# Patient Record
Sex: FEMALE | URBAN - METROPOLITAN AREA
[De-identification: names, ages, dates, MRNs, and addresses within clinical notes are randomized per-mention and may not be internally consistent; named-entity substitution may affect disease eponyms.]

---

## 2024-02-02 ENCOUNTER — LAB REQUISITION (OUTPATIENT)
Dept: LAB | Facility: HOSPITAL | Age: 24
End: 2024-02-02
Payer: COMMERCIAL

## 2024-02-02 DIAGNOSIS — Z11.3 ENCOUNTER FOR SCREENING FOR INFECTIONS WITH A PREDOMINANTLY SEXUAL MODE OF TRANSMISSION: ICD-10-CM

## 2024-02-02 PROCEDURE — 87624 HPV HI-RISK TYP POOLED RSLT: CPT

## 2024-02-02 PROCEDURE — 88141 CYTOPATH C/V INTERPRET: CPT | Performed by: PATHOLOGY

## 2024-02-02 PROCEDURE — 87389 HIV-1 AG W/HIV-1&-2 AB AG IA: CPT

## 2024-02-02 PROCEDURE — 88175 CYTOPATH C/V AUTO FLUID REDO: CPT

## 2024-02-02 PROCEDURE — 87591 N.GONORRHOEAE DNA AMP PROB: CPT

## 2024-02-02 PROCEDURE — 86780 TREPONEMA PALLIDUM: CPT

## 2024-02-02 PROCEDURE — 87800 DETECT AGNT MULT DNA DIREC: CPT

## 2024-02-02 PROCEDURE — 87491 CHLMYD TRACH DNA AMP PROBE: CPT

## 2024-02-03 LAB
C TRACH RRNA SPEC QL NAA+PROBE: NEGATIVE
HIV 1+2 AB+HIV1 P24 AG SERPL QL IA: NONREACTIVE
N GONORRHOEA DNA SPEC QL PROBE+SIG AMP: NEGATIVE
TREPONEMA PALLIDUM IGG+IGM AB [PRESENCE] IN SERUM OR PLASMA BY IMMUNOASSAY: NONREACTIVE

## 2024-02-05 ENCOUNTER — LAB REQUISITION (OUTPATIENT)
Dept: LAB | Facility: HOSPITAL | Age: 24
End: 2024-02-05
Payer: COMMERCIAL

## 2024-02-05 DIAGNOSIS — Z12.4 ENCOUNTER FOR SCREENING FOR MALIGNANT NEOPLASM OF CERVIX: ICD-10-CM

## 2024-02-05 DIAGNOSIS — Z11.51 ENCOUNTER FOR SCREENING FOR HUMAN PAPILLOMAVIRUS (HPV): ICD-10-CM

## 2024-02-19 LAB
CYTOLOGY CMNT CVX/VAG CYTO-IMP: NORMAL
HPV HR GENOTYPES PNL CVX NAA+PROBE: POSITIVE
LAB AP CONTRACEPTIVE HISTORY: NORMAL
LAB AP HPV GENOTYPE QUESTION: NO
LAB AP HPV HR: NORMAL
LABORATORY COMMENT REPORT: NORMAL
LMP START DATE: NORMAL
PATH REPORT.TOTAL CANCER: NORMAL

## 2024-02-28 ENCOUNTER — PROCEDURE VISIT (OUTPATIENT)
Dept: OBSTETRICS AND GYNECOLOGY | Facility: HOSPITAL | Age: 24
End: 2024-02-28
Payer: COMMERCIAL

## 2024-02-28 VITALS — SYSTOLIC BLOOD PRESSURE: 113 MMHG | DIASTOLIC BLOOD PRESSURE: 77 MMHG | WEIGHT: 146 LBS

## 2024-02-28 DIAGNOSIS — Z98.890 HISTORY OF COLPOSCOPY: ICD-10-CM

## 2024-02-28 DIAGNOSIS — R87.611 PAP SMEAR OF CERVIX WITH ASCUS, CANNOT EXCLUDE HGSIL: Primary | ICD-10-CM

## 2024-02-28 PROCEDURE — 81025 URINE PREGNANCY TEST: CPT | Performed by: STUDENT IN AN ORGANIZED HEALTH CARE EDUCATION/TRAINING PROGRAM

## 2024-02-28 PROCEDURE — 57455 BIOPSY OF CERVIX W/SCOPE: CPT | Performed by: STUDENT IN AN ORGANIZED HEALTH CARE EDUCATION/TRAINING PROGRAM

## 2024-02-28 PROCEDURE — 57455 BIOPSY OF CERVIX W/SCOPE: CPT | Mod: GC | Performed by: STUDENT IN AN ORGANIZED HEALTH CARE EDUCATION/TRAINING PROGRAM

## 2024-02-28 PROCEDURE — 88305 TISSUE EXAM BY PATHOLOGIST: CPT | Performed by: STUDENT IN AN ORGANIZED HEALTH CARE EDUCATION/TRAINING PROGRAM

## 2024-02-28 PROCEDURE — 88305 TISSUE EXAM BY PATHOLOGIST: CPT | Mod: TC,SUR | Performed by: STUDENT IN AN ORGANIZED HEALTH CARE EDUCATION/TRAINING PROGRAM

## 2024-02-28 ASSESSMENT — PAIN SCALES - GENERAL: PAINLEVEL: 0-NO PAIN

## 2024-02-28 NOTE — PROGRESS NOTES
Subjective   Patient ID: Stormy Delatorre is a 23 y.o. female who presents for Colposcopy (Patient here for colpo/Last PAP 2-2-24 Lsil/Patient denies falls/Patient denies pain)    Prior history as follows:  2024 LSIL/hrHPV(+), untyped  2022 LSIL/ASC-H    Smoking status: never a smoker  Contraception: ring  HPV vaccination: yes  HIV status: is HIV negative    Visit Vitals  /77     Physical Exam   General: Examination reveals a well developed, well nourished, female, in no acute distress. She is alert and cooperative.  HEENT: External ears normal. Nose normal, no erythema or discharge. Mouth and throat clear.  Neck: supple, no significant adenopathy.  Lungs: normal effort.  Cardiac: regular rate.  Abdomen: soft, nontender, nondistended  Extremities: no limitation in range of motion.  Neurological: alert, oriented, normal speech, no focal findings or movement disorder noted.  Psychological: awake and alert; oriented to person, place, and time.    Colposcopy     Date/Time: 2/28/2024 3:26 PM     Performed by: Richie Berman MD  Authorized by: Jennifer Boston MD    Procedure location: cervix    Consent:     Patient questions answered: yes      Consent obtained:  Written    Consent given by:  Patient  Universal Protocol:     A time out verifies correct patient, procedure, equipment, support staff, and site/side marked as required: yes      Patient states understanding of procedure being performed: yes      Relevant documents present and verified: yes    Indication:     Cervical indication(s): ASC-H    Pre-procedure:     Speculum was placed in the vagina: yes      Prep solution(s): acetic acid and Lugol's iodine    Procedure:     Colposcopy with: cervical biopsy and endocervical curettage      Biopsy taken: yes      Biopsy location(s): 5 o'clock, 7 o'clock    Cervix visibility: fully visualized      SCJ visibility: fully visualized      Lesion visualized: fully visualized      Acetowhite lesion(s): cervix      Acetowhite  lesion(s) description:  5 o'clock and 7 o'clock    Vascular changes: cervix      Vascular lesion description:  5 o'clock    Cervical impression: low grade      Ferric subsulfate solution applied: yes    Post-procedure:     Patient tolerance of procedure:  Patient tolerated the procedure well with no immediate complications    Assessment/Plan   5 o'clock  Colposcopic impression:  [place] index  Color 1  Margins 0  Vessels 0  Iodine staining 1  Total 2, consistent with SUNNY I    7 o'clock  Colposcopic impression:  [place] index  Color 1  Margins 0  Vessels 1  Iodine staining 0  Total 2, consistent with SUNNY I    Seen and discussed with Dr. Darvin Berman MD  Gynecology

## 2024-02-28 NOTE — PROGRESS NOTES
27 yo F presents for colposcopy for hx of LSIL/ASC-H pap. Denies smoking hx. Up to date on Gardasil vaccine. Nuvaring contraception.    Patient ID: Stormy Delatorre is a 23 y.o. female.    Colposcopy    Date/Time: 2/28/2024 3:26 PM    Performed by: Richie Berman MD  Authorized by: Jennifer Boston MD    Procedure location: cervix    Consent:     Patient questions answered: yes      Consent obtained:  Written    Consent given by:  Patient  Universal Protocol:     A time out verifies correct patient, procedure, equipment, support staff, and site/side marked as required: yes      Patient states understanding of procedure being performed: yes      Relevant documents present and verified: yes    Indication:     Cervical indication(s): ASC-H    Pre-procedure:     Speculum was placed in the vagina: yes      Prep solution(s): acetic acid and Lugol's iodine    Procedure:     Colposcopy with: cervical biopsy and endocervical curettage      Biopsy taken: yes      Biopsy location(s): 5 o'clock, 7 o'clock    Cervix visibility: fully visualized      SCJ visibility: fully visualized      Lesion visualized: fully visualized      Acetowhite lesion(s): cervix      Acetowhite lesion(s) description:  5 o'clock and 7 o'clock    Vascular changes: cervix      Vascular lesion description:  5 o'clock    Cervical impression: low grade      Ferric subsulfate solution applied: yes    Post-procedure:     Patient tolerance of procedure:  Patient tolerated the procedure well with no immediate complications    Assessment  27 yo F presents for colposcopy for hx of LSIL/ASC-H pap.    Colposcopy performed without complications. Biopsies at 5 o'clock ([place]: 2 acetowhite, iodine staining) and 7 o'clock ([place]: 2 acetowhite, vessels) consistent with SUNNY I.    Seen and discussed with Dr. Darvin Berman MD  Gynecology

## 2024-03-01 LAB — PREGNANCY TEST URINE, POC: NEGATIVE

## 2024-03-05 LAB
LABORATORY COMMENT REPORT: NORMAL
PATH REPORT.FINAL DX SPEC: NORMAL
PATH REPORT.GROSS SPEC: NORMAL
PATH REPORT.TOTAL CANCER: NORMAL

## 2024-03-06 ENCOUNTER — TELEPHONE (OUTPATIENT)
Dept: OBSTETRICS AND GYNECOLOGY | Facility: HOSPITAL | Age: 24
End: 2024-03-06
Payer: COMMERCIAL

## 2024-03-06 NOTE — TELEPHONE ENCOUNTER
----- Message from Jennifer Boston MD sent at 3/6/2024 10:13 AM EST -----  History:   - LSIL/ASC-H cytology  2024 - LSIL cytology    Low-risk colposcopic impression with no SUNNY identified on biopsies.    Repeat cytology in 1 year (2025).    Patient returned call to office to discuss results  Patient identified by name and   Informed patient that her Colposcopy results were negative and at this time she will just return to office in one year (2025) for a repeat pap with cotesting  Patient verbalized understanding  Encouraged patient to call office with any questions or concerns  Will retime to help patient get scheduled for next year  Joaquina Gonzalez RN

## 2024-03-06 NOTE — RESULT ENCOUNTER NOTE
History:  2022 - LSIL/ASC-H cytology  2/2024 - LSIL cytology    Low-risk colposcopic impression with no SUNNY identified on biopsies.    Repeat cytology in 1 year (Feb 2025).

## 2024-12-26 ENCOUNTER — TELEPHONE (OUTPATIENT)
Dept: OBSTETRICS AND GYNECOLOGY | Facility: HOSPITAL | Age: 24
End: 2024-12-26
Payer: COMMERCIAL

## 2024-12-26 NOTE — TELEPHONE ENCOUNTER
RN called pt to schedule annual and repeat PAP 2/2025. Left message for pt to call the office to get scheduled.  VALERIA Duran

## 2024-12-26 NOTE — TELEPHONE ENCOUNTER
----- Message from Jennifer Boston sent at 3/6/2024 10:13 AM EST -----  History:  2022 - LSIL/ASC-H cytology  2/2024 - LSIL cytology    Low-risk colposcopic impression with no SUNNY identified on biopsies.    Repeat cytology in 1 year (Feb 2025).

## 2025-01-02 NOTE — TELEPHONE ENCOUNTER
RN tried to call patient to schedule repeat pap but phone calls were not going through.   RN sent mychart message encouraging patient to call the office to schedule annual exam and repeat pap.     America GARDNERN, RN

## 2025-01-13 ENCOUNTER — TELEPHONE (OUTPATIENT)
Dept: OBSTETRICS AND GYNECOLOGY | Facility: HOSPITAL | Age: 25
End: 2025-01-13
Payer: COMMERCIAL

## 2025-01-13 NOTE — TELEPHONE ENCOUNTER
RN called patient to get scheduled   No answer at this time  Voicemail left encouraging patient to call the office back     America GARDNERN, RN

## 2025-01-16 ENCOUNTER — TELEPHONE (OUTPATIENT)
Dept: OBSTETRICS AND GYNECOLOGY | Facility: HOSPITAL | Age: 25
End: 2025-01-16
Payer: COMMERCIAL

## 2025-01-16 NOTE — TELEPHONE ENCOUNTER
RN called patient to schedule annual and repeat pap  No answer at this time  Voicemail left encouraging patient to call the office to schedule.   RN's have reached out 5x to schedule patient.   RN will have office send certified letter.     America GARDNERN, RN

## 2025-01-28 ENCOUNTER — TELEPHONE (OUTPATIENT)
Dept: OBSTETRICS AND GYNECOLOGY | Facility: HOSPITAL | Age: 25
End: 2025-01-28
Payer: COMMERCIAL

## 2025-01-28 NOTE — TELEPHONE ENCOUNTER
RN called patient to schedule repeat PAP. Pt name and  verified. Pt scheduled for repeat PAP and annual on 25. Pt educated on the importance of repeat PAP after colpo. Pt verbalized understanding. All questions and concerns addressed.  VALERIA Duran

## 2025-02-25 ENCOUNTER — APPOINTMENT (OUTPATIENT)
Dept: OBSTETRICS AND GYNECOLOGY | Facility: HOSPITAL | Age: 25
End: 2025-02-25
Payer: COMMERCIAL